# Patient Record
(demographics unavailable — no encounter records)

---

## 2024-11-22 NOTE — PHYSICAL EXAM
[General Appearance - Well Developed] : well developed [Normal Appearance] : normal appearance [Well Groomed] : well groomed [General Appearance - Well Nourished] : well nourished [No Deformities] : no deformities [General Appearance - In No Acute Distress] : no acute distress [Normal Conjunctiva] : the conjunctiva exhibited no abnormalities [Eyelids - No Xanthelasma] : the eyelids demonstrated no xanthelasmas [Normal Oral Mucosa] : normal oral mucosa [No Oral Pallor] : no oral pallor [No Oral Cyanosis] : no oral cyanosis [Normal Jugular Venous A Waves Present] : normal jugular venous A waves present [Normal Jugular Venous V Waves Present] : normal jugular venous V waves present [No Jugular Venous Antonio A Waves] : no jugular venous antonio A waves [Heart Rate And Rhythm] : heart rate and rhythm were normal [Heart Sounds] : normal S1 and S2 [Murmurs] : no murmurs present [Respiration, Rhythm And Depth] : normal respiratory rhythm and effort [Exaggerated Use Of Accessory Muscles For Inspiration] : no accessory muscle use [Auscultation Breath Sounds / Voice Sounds] : lungs were clear to auscultation bilaterally [Bowel Sounds] : normal bowel sounds [Abdomen Soft] : soft [Abdomen Tenderness] : non-tender [Abdomen Mass (___ Cm)] : no abdominal mass palpated [Abnormal Walk] : normal gait [Gait - Sufficient For Exercise Testing] : the gait was sufficient for exercise testing [Nail Clubbing] : no clubbing of the fingernails [Cyanosis, Localized] : no localized cyanosis [Petechial Hemorrhages (___cm)] : no petechial hemorrhages [Skin Color & Pigmentation] : normal skin color and pigmentation [] : no rash [No Venous Stasis] : no venous stasis [Skin Lesions] : no skin lesions [No Skin Ulcers] : no skin ulcer [No Xanthoma] : no  xanthoma was observed [Oriented To Time, Place, And Person] : oriented to person, place, and time

## 2024-12-10 NOTE — DISCUSSION/SUMMARY
[FreeTextEntry1] : PAIGE COMPLIANT AND BENEFITING ORDER SUPPLIES SARCOIDOSIS/ CARDIAC OFF TX PET SCAN REVIEWED CARDIO REVIEWED WEIGHT LOSS

## 2024-12-10 NOTE — REASON FOR VISIT
[Follow-Up] : a follow-up visit [Sleep Apnea] : sleep apnea [Sarcoidosis] : sarcoidosis [TextBox_44] : SARCOIDOSIS

## 2025-04-10 NOTE — CONSULT LETTER
[Dear  ___] : Dear  [unfilled], [Consult Letter:] : I had the pleasure of evaluating your patient, [unfilled]. [Please see my note below.] : Please see my note below. [Consult Closing:] : Thank you very much for allowing me to participate in the care of this patient.  If you have any questions, please do not hesitate to contact me. [Sincerely,] : Sincerely, [FreeTextEntry3] : Grey Dhillon DO Attending Physician, Hematology/ Medical Oncology [phone] office

## 2025-04-10 NOTE — CONSULT LETTER
[Dear  ___] : Dear  [unfilled], [Consult Letter:] : I had the pleasure of evaluating your patient, [unfilled]. [Please see my note below.] : Please see my note below. [Consult Closing:] : Thank you very much for allowing me to participate in the care of this patient.  If you have any questions, please do not hesitate to contact me. [Sincerely,] : Sincerely, [FreeTextEntry3] : Grey Dhillon DO Attending Physician, Hematology/ Medical Oncology [phone] office

## 2025-04-10 NOTE — CONSULT LETTER
[Dear  ___] : Dear  [unfilled], [Consult Letter:] : I had the pleasure of evaluating your patient, [unfilled]. [Please see my note below.] : Please see my note below. [Consult Closing:] : Thank you very much for allowing me to participate in the care of this patient.  If you have any questions, please do not hesitate to contact me. [Sincerely,] : Sincerely, [FreeTextEntry3] : Grey Dhillon DO Attending Physician, Hematology/ Medical Oncology [phone] office

## 2025-04-10 NOTE — REVIEW OF SYSTEMS
[Negative] : Allergic/Immunologic [Fever] : no fever [Chills] : no chills [Chest Pain] : no chest pain [FreeTextEntry2] : reports mild facial fullness since being on prednisone 20mg since 06/2019

## 2025-04-10 NOTE — ASSESSMENT
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 Balbuena; last MRI Cardiac on 12.6 suggests improvement in cardiac sarcoidosis - he remains on prednisone 20mg daily since 06/2019  - PCP prophylaxis (no dapsone / G6PD, sulfa allergy); completed atovaquone daily  - followup with PULM, Dr. Moisés Maravilla, as scheduled ; he also follows at [facility]   # Device associated (pacemaker) left upper extremity DVT occurring 3 1/2 months after device placed also complicated by subsegmental pulmonary emboli bilaterally - c/w eliquis until otherwise instructed; he was originally told for about 6 months duration per Dr. Balbuena but wishes to continue prescription since he is tolerating without complication and pacemaker device still implanted - CTa Chest from 02.2020 at [facility] shows hilar and mediastinal adenopathy; NO evidence of DVT - hypercoagulability workup from 02/2020 was negative, would be a weak reason to continue a/c lifelong; vascular team does not recommend lifelong a/c; pt wishes to continue - ultrasound in [facility] showed no clot in subclavian left vein but suspicion for dvt in IJV seen by vascular surgery at [facility] - he continues Eliquis 5mg every 12 hours  # Hemolytic anemia, believed to be provoked by the use of dapsone.  - His hgb /hct has been WNL even though haptoglobin and indirect bili is decreased.  - No clear explanation: pia neg,  g6pd neg, or HS or thalassemia or chronic liver disease, which are all negative; LDH is normal ; retic count is normal ; mild elevation of indirect bili raises suspicion of gilbert syndrome; however, haptoglobin is decreased - pia and G6PD negative;  since pia is negative, this suggests it is less likely autoimmune hemolysis secondary to sarcoidosis - Labwork today: CBC, BMP, LFT, GGT, LDH, direct pia test, haptoglobin, retic count - will continue to monitor hgb; pt instructed to avoid dapsone  # Elevated Bili and ALT - has been intermittently elevated in the past - ggi is mildly elevated: need to follow w/ GI/hepatology  RTC 1 month with CBC, BMP, LFT, LDH, direct pia test, haptoglobin, retic count prior  seen examined w/ NP MELBA Baer; note reviewed; case discussed; agree with the plan

## 2025-04-10 NOTE — HISTORY OF PRESENT ILLNESS
[de-identified] : Mr. 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 Balbuena.  Patient is a 58 y/o M with PMH of DM, complete heart block s/p PPM, Sarcoidosis recently started on Methotrexate and prednisone around June 2019.  Patient had been noticing mild swelling in left upper extremity for a few days but then had an increase in painful swelling around left axilla/left arm so he presented to the ER in late July 2019.  The patient states that he was diagnosed with sarcoidosis in October 2015. It was an incidental finding on a CT scan, that was given to the patient because of his strong family cardiovascular history.  He was not given any treatment or steroids at that time since he was asymptomatic.  The patient was previously asymptomatic but on April 17th 2019 he developed third degree heart block (possibly 2/2 the sarcoidosis) and was given a pacemaker and internal defibrillator as a result.  Due to his recent cardiac symptoms, thought to be related to his sarcoidosis, he was started on prednisone, methotrexate 125 mg and folic acid 1mg on June 10th 2019.   Patient was started on prednisone 40mg in June and being tapered by 5mg every 2 weeks.  He was also started on dapsone for PCP prophylaxis being on the prednisone (patient could not be given Bactrim 2/2 sulfa allergy) on July 7th 2019.  He stopped Dapsone 100mg on 7.30.19 and weekly methotrexate was stopped on 8.5.19 after admission to the hospital.  The patient noted that his eyes started to become increasingly yellow since he was admitted to the hospital.  The patient denies hemolysis in the past or a family history of any hematological or autoimmune conditions.  He is current taking 20mg of prednisone daily since 8.2.19 and was instructed to continue at this dose.  He completed a 21 day course of atovaquone   No history of venous thromboembolism in the past. No reported family history of bleeding or clotting disorders. \par  \par  LAB WORKUP\par  (8/9/19) WBC 10.33, Hgb 12.6, .4, RDW 15.9, , Bili 1.9, , Haptoglobin <20\par  (7/30/19) WBC 12.55, Hgb 14.3, MCV 99.3, RDW 17.7, , Retic count 3.9%\par  (7/10/18) WBC 12.10, Hgb 13.7, MCV 89.1, RDW 13.0, \par  \par  RADIOLOGIC WORKUP\par  US LE (7/30/19) Impression:No evidence of deep venous thrombosis or superficial thrombophlebitis in bilateral lower extremities.\par  CTa (7/29/19) IMPRESSION:  1. Bilateral subsegmental pulmonary emboli. No right heart strain.2. Stable mediastinal and bilateral hilar lymph nodes, compatible with sarcoidosis.3. Bilateral pulmonary nodules measuring up to 6 mm in the subpleural left lower lobe, with the majority stable since 2015. [de-identified] : 9/18/19 He is here for follow-up of hemolytic anemia.  He reports feeling well.  He has been tolerating Eliquis for about 3 months, treated by [facility].  He states they would like him to continue on it for 6 months duration.  He takes prednisone 20mg daily as maintenance.  Discussed that since pia is negative, this suggests it is less likely autoimmune hemolysis secondary to sarcoidosis.  1/3/20 He is here for follow-up of hemolytic anemia.  He reports feeling well.  He has been tolerating Eliquis for about 6 months now without complications.  He takes prednisone 20mg daily as maintenance for sarcoidosis managed by Dr. Balbuena.  He had MR Cardiac performed on 12.6 which suggested improvement in cardiac sarcoidosis.  He has also been evaluated by CARDIOLOGY for bouts of VT and has subsequently undergone ablation with improvement of symptoms.  Reviewed most recent CBC with patient, leucocytosis likely due to prednisone as he is feeling well.    2/28/2020 He is here for follow-up of hemolytic anemia.  Reviewed most recent scans which show adenoapathy, however patient already follows with Dr. Balbuena for PULM for sarcoidosis and restarted MTX 12.5mg weekly this past Monday.  They are anticipating tapering prednisone, but he remains on 20mg daily for now.  He also remains on Eliquis BiD and would like to continue for now since he is tolerating well.  We discussed that the event was likely provoked by the ICD wires and therefore lifelong a/c is not warranted. CXR (1.29.2020) IMPRESSION: Mediastinal and hilar lymphadenopathy seen to better advantage on yesterday's CT.  Left pacemaker/ICD.  No radiopaque foreign body identified.  CTa Chest (1.29.2020) IMPRESSION:  No definite evidence of lesion in the lower neck near left IJ vein.  Heterogenous contrast opacification at this level related to streak artifact and mixing.  Calcification in the anterior aspect of confluence of right IJ/subclavian vein probably related to prior thrombosis/recanalized thrombus.  2.  Bilateral hilar and mediastinal adenopathy, unchanged.  Some small solid nodules that are unchanged although others that are not comparable due to differences in technique from prior studies.  3.  New T4 sclerosis of uncertain etiology.  THis possibly may relate to sarcoidosis although correlation with any other significant history is suggested.  Follow-up chest CT  is suggested to reassess such as in 3-6 months, as indicated.  8/28/2020 Patient is here for a follow-up visit for hx of DVT and hemolytic anemia.  He is feeling well with no new complaints.  Most recent CBC is stable.  Patient denies fever, chills, nausea, vomiting, easier bruising or bleeding.  He remains on Eliquis 5mg BiD just over 1 year now and is adamant he wishes to continue, despite risks.  We reviewed research studies regarding risks v benefits of continuing Eliiquis.  He saw VASC SURG at [facility] who had stable findings on reimaging, and also stated length of need about 6-12 months but no indication to necessarily continue.    4/10/25 Patient is here for a follow-up visit for hx of DVT and hemolytic anemia.  He is feeling well with no new complaints.  Most recent CBC from outside lab is stable.  Patient denies fever, chills, nausea, vomiting, easier bruising or bleeding.  He remains on Eliquis 5mg BiD.   Labwork (3.10.2025) WBC 6, Hgb 15.3, , Total Bili 1.7, Indirect Bili 1.4, normal LFTs, Cr 0.89

## 2025-04-10 NOTE — HISTORY OF PRESENT ILLNESS
[de-identified] : Mr. 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 Balbuena.  Patient is a 56 y/o M with PMH of DM, complete heart block s/p PPM, Sarcoidosis recently started on Methotrexate and prednisone around June 2019.  Patient had been noticing mild swelling in left upper extremity for a few days but then had an increase in painful swelling around left axilla/left arm so he presented to the ER in late July 2019.  The patient states that he was diagnosed with sarcoidosis in October 2015. It was an incidental finding on a CT scan, that was given to the patient because of his strong family cardiovascular history.  He was not given any treatment or steroids at that time since he was asymptomatic.  The patient was previously asymptomatic but on April 17th 2019 he developed third degree heart block (possibly 2/2 the sarcoidosis) and was given a pacemaker and internal defibrillator as a result.  Due to his recent cardiac symptoms, thought to be related to his sarcoidosis, he was started on prednisone, methotrexate 125 mg and folic acid 1mg on June 10th 2019.   Patient was started on prednisone 40mg in June and being tapered by 5mg every 2 weeks.  He was also started on dapsone for PCP prophylaxis being on the prednisone (patient could not be given Bactrim 2/2 sulfa allergy) on July 7th 2019.  He stopped Dapsone 100mg on 7.30.19 and weekly methotrexate was stopped on 8.5.19 after admission to the hospital.  The patient noted that his eyes started to become increasingly yellow since he was admitted to the hospital.  The patient denies hemolysis in the past or a family history of any hematological or autoimmune conditions.  He is current taking 20mg of prednisone daily since 8.2.19 and was instructed to continue at this dose.  He completed a 21 day course of atovaquone   No history of venous thromboembolism in the past. No reported family history of bleeding or clotting disorders. \par  \par  LAB WORKUP\par  (8/9/19) WBC 10.33, Hgb 12.6, .4, RDW 15.9, , Bili 1.9, , Haptoglobin <20\par  (7/30/19) WBC 12.55, Hgb 14.3, MCV 99.3, RDW 17.7, , Retic count 3.9%\par  (7/10/18) WBC 12.10, Hgb 13.7, MCV 89.1, RDW 13.0, \par  \par  RADIOLOGIC WORKUP\par  US LE (7/30/19) Impression:No evidence of deep venous thrombosis or superficial thrombophlebitis in bilateral lower extremities.\par  CTa (7/29/19) IMPRESSION:  1. Bilateral subsegmental pulmonary emboli. No right heart strain.2. Stable mediastinal and bilateral hilar lymph nodes, compatible with sarcoidosis.3. Bilateral pulmonary nodules measuring up to 6 mm in the subpleural left lower lobe, with the majority stable since 2015. [de-identified] : 9/18/19 He is here for follow-up of hemolytic anemia.  He reports feeling well.  He has been tolerating Eliquis for about 3 months, treated by [facility].  He states they would like him to continue on it for 6 months duration.  He takes prednisone 20mg daily as maintenance.  Discussed that since pia is negative, this suggests it is less likely autoimmune hemolysis secondary to sarcoidosis.  1/3/20 He is here for follow-up of hemolytic anemia.  He reports feeling well.  He has been tolerating Eliquis for about 6 months now without complications.  He takes prednisone 20mg daily as maintenance for sarcoidosis managed by Dr. Balbuena.  He had MR Cardiac performed on 12.6 which suggested improvement in cardiac sarcoidosis.  He has also been evaluated by CARDIOLOGY for bouts of VT and has subsequently undergone ablation with improvement of symptoms.  Reviewed most recent CBC with patient, leucocytosis likely due to prednisone as he is feeling well.    2/28/2020 He is here for follow-up of hemolytic anemia.  Reviewed most recent scans which show adenoapathy, however patient already follows with Dr. Balbuena for PULM for sarcoidosis and restarted MTX 12.5mg weekly this past Monday.  They are anticipating tapering prednisone, but he remains on 20mg daily for now.  He also remains on Eliquis BiD and would like to continue for now since he is tolerating well.  We discussed that the event was likely provoked by the ICD wires and therefore lifelong a/c is not warranted. CXR (1.29.2020) IMPRESSION: Mediastinal and hilar lymphadenopathy seen to better advantage on yesterday's CT.  Left pacemaker/ICD.  No radiopaque foreign body identified.  CTa Chest (1.29.2020) IMPRESSION:  No definite evidence of lesion in the lower neck near left IJ vein.  Heterogenous contrast opacification at this level related to streak artifact and mixing.  Calcification in the anterior aspect of confluence of right IJ/subclavian vein probably related to prior thrombosis/recanalized thrombus.  2.  Bilateral hilar and mediastinal adenopathy, unchanged.  Some small solid nodules that are unchanged although others that are not comparable due to differences in technique from prior studies.  3.  New T4 sclerosis of uncertain etiology.  THis possibly may relate to sarcoidosis although correlation with any other significant history is suggested.  Follow-up chest CT  is suggested to reassess such as in 3-6 months, as indicated.  8/28/2020 Patient is here for a follow-up visit for hx of DVT and hemolytic anemia.  He is feeling well with no new complaints.  Most recent CBC is stable.  Patient denies fever, chills, nausea, vomiting, easier bruising or bleeding.  He remains on Eliquis 5mg BiD just over 1 year now and is adamant he wishes to continue, despite risks.  We reviewed research studies regarding risks v benefits of continuing Eliiquis.  He saw VASC SURG at [facility] who had stable findings on reimaging, and also stated length of need about 6-12 months but no indication to necessarily continue.    4/10/25 Patient is here for a follow-up visit for hx of DVT and hemolytic anemia.  He is feeling well with no new complaints.  Most recent CBC from outside lab is stable.  Patient denies fever, chills, nausea, vomiting, easier bruising or bleeding.  He remains on Eliquis 5mg BiD.   Labwork (3.10.2025) WBC 6, Hgb 15.3, , Total Bili 1.7, Indirect Bili 1.4, normal LFTs, Cr 0.89  04-Jan-2025 12:49

## 2025-04-10 NOTE — CONSULT LETTER
[Dear  ___] : Dear  [unfilled], [Consult Letter:] : I had the pleasure of evaluating your patient, [unfilled]. [Please see my note below.] : Please see my note below. [Consult Closing:] : Thank you very much for allowing me to participate in the care of this patient.  If you have any questions, please do not hesitate to contact me. [Sincerely,] : Sincerely, [FreeTextEntry3] : Grey Dhillon DO Attending Physician, Hematology/ Medical Oncology [phone] office

## 2025-04-10 NOTE — ASSESSMENT
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 Balbuena; last MRI Cardiac on 12.6 suggests improvement in cardiac sarcoidosis - he remains on prednisone 20mg daily since 06/2019  - PCP prophylaxis (no dapsone / G6PD, sulfa allergy); completed atovaquone daily  - followup with PULM, Dr. Moisés Maravilla, as scheduled ; he also follows at [facility]   # Device associated (pacemaker) left upper extremity DVT occurring 3 1/2 months after device placed also complicated by subsegmental pulmonary emboli bilaterally - c/w eliquis until otherwise instructed; he was originally told for about 6 months duration per Dr. Balbuena but wishes to continue prescription since he is tolerating without complication and pacemaker device still implanted - CTa Chest from 02.2020 at [facility] shows hilar and mediastinal adenopathy; NO evidence of DVT - hypercoagulability workup from 02/2020 was negative, would be a weak reason to continue a/c lifelong; vascular team does not recommend lifelong a/c; pt wishes to continue - ultrasound in [facility] showed no clot in subclavian left vein but suspicion for dvt in IJV seen by vascular surgery at [facility] - he continues Eliquis 5mg every 12 hours  # Hemolytic anemia, believed to be provoked by the use of dapsone.  - His hgb /hct has been WNL even though haptoglobin and indirect bili is decreased.  - No clear explanation: pia neg,  g6pd neg, or HS or thalassemia or chronic liver disease, which are all negative; LDH is normal ; retic count is normal ; mild elevation of indirect bili raises suspicion of gilbert syndrome; however, haptoglobin is decreased - pia and G6PD negative;  since pia is negative, this suggests it is less likely autoimmune hemolysis secondary to sarcoidosis - Labwork today: CBC, BMP, LFT, GGT, LDH, direct pia test, haptoglobin, retic count - will continue to monitor hgb; pt instructed to avoid dapsone  # Elevated Bili and ALT - has been intermittently elevated in the past - ggi is mildly elevated: need to follow w/ GI/hepatology  RTC 1 month with CBC, BMP, LFT, LDH, direct pia test, haptoglobin, retic count prior  seen examined w/ NP MELBA Baer; note reviewed; case discussed; agree with the plan

## 2025-04-10 NOTE — HISTORY OF PRESENT ILLNESS
[de-identified] : Mr. 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 Balbuena.  Patient is a 58 y/o M with PMH of DM, complete heart block s/p PPM, Sarcoidosis recently started on Methotrexate and prednisone around June 2019.  Patient had been noticing mild swelling in left upper extremity for a few days but then had an increase in painful swelling around left axilla/left arm so he presented to the ER in late July 2019.  The patient states that he was diagnosed with sarcoidosis in October 2015. It was an incidental finding on a CT scan, that was given to the patient because of his strong family cardiovascular history.  He was not given any treatment or steroids at that time since he was asymptomatic.  The patient was previously asymptomatic but on April 17th 2019 he developed third degree heart block (possibly 2/2 the sarcoidosis) and was given a pacemaker and internal defibrillator as a result.  Due to his recent cardiac symptoms, thought to be related to his sarcoidosis, he was started on prednisone, methotrexate 125 mg and folic acid 1mg on June 10th 2019.   Patient was started on prednisone 40mg in June and being tapered by 5mg every 2 weeks.  He was also started on dapsone for PCP prophylaxis being on the prednisone (patient could not be given Bactrim 2/2 sulfa allergy) on July 7th 2019.  He stopped Dapsone 100mg on 7.30.19 and weekly methotrexate was stopped on 8.5.19 after admission to the hospital.  The patient noted that his eyes started to become increasingly yellow since he was admitted to the hospital.  The patient denies hemolysis in the past or a family history of any hematological or autoimmune conditions.  He is current taking 20mg of prednisone daily since 8.2.19 and was instructed to continue at this dose.  He completed a 21 day course of atovaquone   No history of venous thromboembolism in the past. No reported family history of bleeding or clotting disorders. \par  \par  LAB WORKUP\par  (8/9/19) WBC 10.33, Hgb 12.6, .4, RDW 15.9, , Bili 1.9, , Haptoglobin <20\par  (7/30/19) WBC 12.55, Hgb 14.3, MCV 99.3, RDW 17.7, , Retic count 3.9%\par  (7/10/18) WBC 12.10, Hgb 13.7, MCV 89.1, RDW 13.0, \par  \par  RADIOLOGIC WORKUP\par  US LE (7/30/19) Impression:No evidence of deep venous thrombosis or superficial thrombophlebitis in bilateral lower extremities.\par  CTa (7/29/19) IMPRESSION:  1. Bilateral subsegmental pulmonary emboli. No right heart strain.2. Stable mediastinal and bilateral hilar lymph nodes, compatible with sarcoidosis.3. Bilateral pulmonary nodules measuring up to 6 mm in the subpleural left lower lobe, with the majority stable since 2015. [de-identified] : 9/18/19 He is here for follow-up of hemolytic anemia.  He reports feeling well.  He has been tolerating Eliquis for about 3 months, treated by [facility].  He states they would like him to continue on it for 6 months duration.  He takes prednisone 20mg daily as maintenance.  Discussed that since pia is negative, this suggests it is less likely autoimmune hemolysis secondary to sarcoidosis.  1/3/20 He is here for follow-up of hemolytic anemia.  He reports feeling well.  He has been tolerating Eliquis for about 6 months now without complications.  He takes prednisone 20mg daily as maintenance for sarcoidosis managed by Dr. Balbuena.  He had MR Cardiac performed on 12.6 which suggested improvement in cardiac sarcoidosis.  He has also been evaluated by CARDIOLOGY for bouts of VT and has subsequently undergone ablation with improvement of symptoms.  Reviewed most recent CBC with patient, leucocytosis likely due to prednisone as he is feeling well.    2/28/2020 He is here for follow-up of hemolytic anemia.  Reviewed most recent scans which show adenoapathy, however patient already follows with Dr. Balbuena for PULM for sarcoidosis and restarted MTX 12.5mg weekly this past Monday.  They are anticipating tapering prednisone, but he remains on 20mg daily for now.  He also remains on Eliquis BiD and would like to continue for now since he is tolerating well.  We discussed that the event was likely provoked by the ICD wires and therefore lifelong a/c is not warranted. CXR (1.29.2020) IMPRESSION: Mediastinal and hilar lymphadenopathy seen to better advantage on yesterday's CT.  Left pacemaker/ICD.  No radiopaque foreign body identified.  CTa Chest (1.29.2020) IMPRESSION:  No definite evidence of lesion in the lower neck near left IJ vein.  Heterogenous contrast opacification at this level related to streak artifact and mixing.  Calcification in the anterior aspect of confluence of right IJ/subclavian vein probably related to prior thrombosis/recanalized thrombus.  2.  Bilateral hilar and mediastinal adenopathy, unchanged.  Some small solid nodules that are unchanged although others that are not comparable due to differences in technique from prior studies.  3.  New T4 sclerosis of uncertain etiology.  THis possibly may relate to sarcoidosis although correlation with any other significant history is suggested.  Follow-up chest CT  is suggested to reassess such as in 3-6 months, as indicated.  8/28/2020 Patient is here for a follow-up visit for hx of DVT and hemolytic anemia.  He is feeling well with no new complaints.  Most recent CBC is stable.  Patient denies fever, chills, nausea, vomiting, easier bruising or bleeding.  He remains on Eliquis 5mg BiD just over 1 year now and is adamant he wishes to continue, despite risks.  We reviewed research studies regarding risks v benefits of continuing Eliiquis.  He saw VASC SURG at [facility] who had stable findings on reimaging, and also stated length of need about 6-12 months but no indication to necessarily continue.    4/10/25 Patient is here for a follow-up visit for hx of DVT and hemolytic anemia.  He is feeling well with no new complaints.  Most recent CBC from outside lab is stable.  Patient denies fever, chills, nausea, vomiting, easier bruising or bleeding.  He remains on Eliquis 5mg BiD.   Labwork (3.10.2025) WBC 6, Hgb 15.3, , Total Bili 1.7, Indirect Bili 1.4, normal LFTs, Cr 0.89

## 2025-04-10 NOTE — ASSESSMENT
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 Balbuena; last MRI Cardiac on 12.6 suggests improvement in cardiac sarcoidosis - he remains on prednisone 20mg daily since 06/2019  - PCP prophylaxis (no dapsone / G6PD, sulfa allergy); completed atovaquone daily  - followup with PULM, Dr. Moisés Maravilla, as scheduled ; he also follows at [facility]   # Device associated (pacemaker) left upper extremity DVT occurring 3 1/2 months after device placed also complicated by subsegmental pulmonary emboli bilaterally - c/w eliquis until otherwise instructed; he was originally told for about 6 months duration per Dr. Balbuena but wishes to continue prescription since he is tolerating without complication and pacemaker device still implanted - CTa Chest from 02.2020 at [facility] shows hilar and mediastinal adenopathy; NO evidence of DVT - hypercoagulability workup from 02/2020 was negative, would be a weak reason to continue a/c lifelong; vascular team does not recommend lifelong a/c; pt wishes to continue - ultrasound in [facility] showed no clot in subclavian left vein but suspicion for dvt in IJV seen by vascular surgery at [facility] - he continues Eliquis 5mg every 12 hours  # Hemolytic anemia, believed to be provoked by the use of dapsone.  - His hgb /hct has been WNL even though haptoglobin and indirect bili is decreased.  - No clear explanation: pia neg,  g6pd neg, or HS or thalassemia or chronic liver disease, which are all negative; LDH is normal ; retic count is normal ; mild elevation of indirect bili raises suspicion of gilbert syndrome; however, haptoglobin is decreased - pia and G6PD negative;  since pia is negative, this suggests it is less likely autoimmune hemolysis secondary to sarcoidosis - Labwork today: CBC, BMP, LFT, GGT, LDH, direct pia test, haptoglobin, retic count - will continue to monitor hgb; pt instructed to avoid dapsone  # Elevated Bili and ALT - has been intermittently elevated in the past - ggi is mildly elevated: need to follow w/ GI/hepatology  RTC 1 month with CBC, BMP, LFT, LDH, direct pia test, haptoglobin, retic count prior  seen examined w/ NP MELBA Baer; note reviewed; case discussed; agree with the plan

## 2025-04-10 NOTE — ASSESSMENT
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 Balbuena; last MRI Cardiac on 12.6 suggests improvement in cardiac sarcoidosis - he remains on prednisone 20mg daily since 06/2019  - PCP prophylaxis (no dapsone / G6PD, sulfa allergy); completed atovaquone daily  - followup with PULM, Dr. Moisés Maravilla, as scheduled ; he also follows at [facility]   # Device associated (pacemaker) left upper extremity DVT occurring 3 1/2 months after device placed also complicated by subsegmental pulmonary emboli bilaterally - c/w eliquis until otherwise instructed; he was originally told for about 6 months duration per Dr. Balbuena but wishes to continue prescription since he is tolerating without complication and pacemaker device still implanted - CTa Chest from 02.2020 at [facility] shows hilar and mediastinal adenopathy; NO evidence of DVT - hypercoagulability workup from 02/2020 was negative, would be a weak reason to continue a/c lifelong; vascular team does not recommend lifelong a/c; pt wishes to continue - ultrasound in [facility] showed no clot in subclavian left vein but suspicion for dvt in IJV seen by vascular surgery at [facility] - he continues Eliquis 5mg every 12 hours  # Hemolytic anemia, believed to be provoked by the use of dapsone.  - His hgb /hct has been WNL even though haptoglobin and indirect bili is decreased.  - No clear explanation: pia neg,  g6pd neg, or HS or thalassemia or chronic liver disease, which are all negative; LDH is normal ; retic count is normal ; mild elevation of indirect bili raises suspicion of gilbert syndrome; however, haptoglobin is decreased - pia and G6PD negative;  since pia is negative, this suggests it is less likely autoimmune hemolysis secondary to sarcoidosis - Labwork today: CBC, BMP, LFT, GGT, LDH, direct pia test, haptoglobin, retic count - will continue to monitor hgb; pt instructed to avoid dapsone  # Elevated Bili and ALT - has been intermittently elevated in the past - ggi is mildly elevated: need to follow w/ GI/hepatology  RTC 1 month with CBC, BMP, LFT, LDH, direct pia test, haptoglobin, retic count prior  seen examined w/ NP MELBA Baer; note reviewed; case discussed; agree with the plan

## 2025-04-10 NOTE — HISTORY OF PRESENT ILLNESS
[de-identified] : Mr. 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 Balbuena.  Patient is a 58 y/o M with PMH of DM, complete heart block s/p PPM, Sarcoidosis recently started on Methotrexate and prednisone around June 2019.  Patient had been noticing mild swelling in left upper extremity for a few days but then had an increase in painful swelling around left axilla/left arm so he presented to the ER in late July 2019.  The patient states that he was diagnosed with sarcoidosis in October 2015. It was an incidental finding on a CT scan, that was given to the patient because of his strong family cardiovascular history.  He was not given any treatment or steroids at that time since he was asymptomatic.  The patient was previously asymptomatic but on April 17th 2019 he developed third degree heart block (possibly 2/2 the sarcoidosis) and was given a pacemaker and internal defibrillator as a result.  Due to his recent cardiac symptoms, thought to be related to his sarcoidosis, he was started on prednisone, methotrexate 125 mg and folic acid 1mg on June 10th 2019.   Patient was started on prednisone 40mg in June and being tapered by 5mg every 2 weeks.  He was also started on dapsone for PCP prophylaxis being on the prednisone (patient could not be given Bactrim 2/2 sulfa allergy) on July 7th 2019.  He stopped Dapsone 100mg on 7.30.19 and weekly methotrexate was stopped on 8.5.19 after admission to the hospital.  The patient noted that his eyes started to become increasingly yellow since he was admitted to the hospital.  The patient denies hemolysis in the past or a family history of any hematological or autoimmune conditions.  He is current taking 20mg of prednisone daily since 8.2.19 and was instructed to continue at this dose.  He completed a 21 day course of atovaquone   No history of venous thromboembolism in the past. No reported family history of bleeding or clotting disorders. \par  \par  LAB WORKUP\par  (8/9/19) WBC 10.33, Hgb 12.6, .4, RDW 15.9, , Bili 1.9, , Haptoglobin <20\par  (7/30/19) WBC 12.55, Hgb 14.3, MCV 99.3, RDW 17.7, , Retic count 3.9%\par  (7/10/18) WBC 12.10, Hgb 13.7, MCV 89.1, RDW 13.0, \par  \par  RADIOLOGIC WORKUP\par  US LE (7/30/19) Impression:No evidence of deep venous thrombosis or superficial thrombophlebitis in bilateral lower extremities.\par  CTa (7/29/19) IMPRESSION:  1. Bilateral subsegmental pulmonary emboli. No right heart strain.2. Stable mediastinal and bilateral hilar lymph nodes, compatible with sarcoidosis.3. Bilateral pulmonary nodules measuring up to 6 mm in the subpleural left lower lobe, with the majority stable since 2015. [de-identified] : 9/18/19 He is here for follow-up of hemolytic anemia.  He reports feeling well.  He has been tolerating Eliquis for about 3 months, treated by [facility].  He states they would like him to continue on it for 6 months duration.  He takes prednisone 20mg daily as maintenance.  Discussed that since pia is negative, this suggests it is less likely autoimmune hemolysis secondary to sarcoidosis.  1/3/20 He is here for follow-up of hemolytic anemia.  He reports feeling well.  He has been tolerating Eliquis for about 6 months now without complications.  He takes prednisone 20mg daily as maintenance for sarcoidosis managed by Dr. Balbuena.  He had MR Cardiac performed on 12.6 which suggested improvement in cardiac sarcoidosis.  He has also been evaluated by CARDIOLOGY for bouts of VT and has subsequently undergone ablation with improvement of symptoms.  Reviewed most recent CBC with patient, leucocytosis likely due to prednisone as he is feeling well.    2/28/2020 He is here for follow-up of hemolytic anemia.  Reviewed most recent scans which show adenoapathy, however patient already follows with Dr. Balbuena for PULM for sarcoidosis and restarted MTX 12.5mg weekly this past Monday.  They are anticipating tapering prednisone, but he remains on 20mg daily for now.  He also remains on Eliquis BiD and would like to continue for now since he is tolerating well.  We discussed that the event was likely provoked by the ICD wires and therefore lifelong a/c is not warranted. CXR (1.29.2020) IMPRESSION: Mediastinal and hilar lymphadenopathy seen to better advantage on yesterday's CT.  Left pacemaker/ICD.  No radiopaque foreign body identified.  CTa Chest (1.29.2020) IMPRESSION:  No definite evidence of lesion in the lower neck near left IJ vein.  Heterogenous contrast opacification at this level related to streak artifact and mixing.  Calcification in the anterior aspect of confluence of right IJ/subclavian vein probably related to prior thrombosis/recanalized thrombus.  2.  Bilateral hilar and mediastinal adenopathy, unchanged.  Some small solid nodules that are unchanged although others that are not comparable due to differences in technique from prior studies.  3.  New T4 sclerosis of uncertain etiology.  THis possibly may relate to sarcoidosis although correlation with any other significant history is suggested.  Follow-up chest CT  is suggested to reassess such as in 3-6 months, as indicated.  8/28/2020 Patient is here for a follow-up visit for hx of DVT and hemolytic anemia.  He is feeling well with no new complaints.  Most recent CBC is stable.  Patient denies fever, chills, nausea, vomiting, easier bruising or bleeding.  He remains on Eliquis 5mg BiD just over 1 year now and is adamant he wishes to continue, despite risks.  We reviewed research studies regarding risks v benefits of continuing Eliiquis.  He saw VASC SURG at [facility] who had stable findings on reimaging, and also stated length of need about 6-12 months but no indication to necessarily continue.    4/10/25 Patient is here for a follow-up visit for hx of DVT and hemolytic anemia.  He is feeling well with no new complaints.  Most recent CBC from outside lab is stable.  Patient denies fever, chills, nausea, vomiting, easier bruising or bleeding.  He remains on Eliquis 5mg BiD.   Labwork (3.10.2025) WBC 6, Hgb 15.3, , Total Bili 1.7, Indirect Bili 1.4, normal LFTs, Cr 0.89

## 2025-04-11 NOTE — HISTORY OF PRESENT ILLNESS
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  This occurred approximately 3 months status post placement of pacemaker/ICD on the left side with associated wires in the left subclavian vein.  Patient also had pulmonary emboli at that time without right heart strain.  He was maintained on anticoagulation with subcu resolution of left arm swelling and all symptoms.  Patient has preferred to stay on the Eliquis since that time and remains on 5 mg twice daily.  Patient's past medical history also significant for cardiac sarcoidosis.  This is now considered to be dormant and he was recently weaned off of prednisone and methotrexate which she had been on for several years.  Patient with no complaints today.  Does not have any left arm swelling except for occasional trace edema which resolves almost immediately.  This trace edema is not related to any specific activity or positioning of the arm.  In 2020 there was some concern and when possible flow disturbance in the right internal jugular vein but CT angiogram was normal as was a duplex scan and it was considered to be likely flow artifact.  Patient here for routine follow-up.  No new complaints today.  He is also followed by pulmonology and hematology.   Per the patient he may require a third wire for the pacemaker to be placed to better treat some underlying arrhythmias.

## 2025-04-11 NOTE — ASSESSMENT
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  This occurred approximately 3 months status post placement of pacemaker/ICD on the left side with associated wires in the left subclavian vein.  Patient also had pulmonary emboli at that time without right heart strain.  He was maintained on anticoagulation with subcu resolution of left arm swelling and all symptoms.  Patient has preferred to stay on the Eliquis since that time and remains on 5 mg twice daily.  Patient's past medical history also significant for cardiac sarcoidosis.  This is now considered to be dormant and he was recently weaned off of prednisone and methotrexate which she had been on for several years.  Patient with no complaints today.  Does not have any left arm swelling except for occasional trace edema which resolves almost immediately.  This trace edema is not related to any specific activity or positioning of the arm.  In 2020 there was some concern and when possible flow disturbance in the right internal jugular vein but CT angiogram was normal as was a duplex scan and it was considered to be likely flow artifact.  Patient here for routine follow-up.  No new complaints today.  He is also followed by pulmonology and hematology.   Per the patient he may require a third wire for the pacemaker to be placed to better treat some underlying arrhythmias.  Duplex scan upper extremities subclavian veins and internal jugular veins bilaterally.  No evidence of deep vein thrombosis or flow disturbance bilaterally.  Pacemaker wires noted on the left side  Imp - pt doing well. Stabel - no evidence of recurrent DVT in upper extremities.  No IJ thrombosis. If needs additional pacing wires placed ok to DC Eliquis.   Pt now off prednisone and methotrexate.  [Arterial/Venous Disease] : arterial/venous disease

## 2025-04-11 NOTE — PHYSICAL EXAM
[JVD] : no jugular venous distention  [Normal Breath Sounds] : Normal breath sounds [Normal Rate and Rhythm] : normal rate and rhythm [2+] : left 2+ [Ankle Swelling (On Exam)] : not present [Varicose Veins Of Lower Extremities] : not present [No Rash or Lesion] : No rash or lesion [Alert] : alert [Oriented to Person] : oriented to person [Oriented to Place] : oriented to place [Oriented to Time] : oriented to time [Calm] : calm [de-identified] : Well-developed male in no distress [de-identified] : Normocephalic [de-identified] : No Or tenderness [de-identified] : Regular heart rhythm on examination today [de-identified] : Full range of motion

## 2025-04-11 NOTE — PROCEDURE
[FreeTextEntry1] : Duplex scan upper extremities subclavian veins and internal jugular veins bilaterally.  No evidence of deep vein thrombosis or flow disturbance bilaterally.  Pacemaker wires noted on the left side

## 2025-05-19 NOTE — ASSESSMENT
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 Balbuena; last MRI Cardiac on 12.6 suggests improvement in cardiac sarcoidosis - he remains on prednisone 20mg daily since 06/2019  - PCP prophylaxis (no dapsone / G6PD, sulfa allergy); completed atovaquone daily  - followup with PULM, Dr. Moisés Maravilla, as scheduled ; he also follows at [facility]   # Device associated (pacemaker) left upper extremity DVT occurring 3 1/2 months after device placed also complicated by subsegmental pulmonary emboli bilaterally - c/w eliquis until otherwise instructed; he was originally told for about 6 months duration per Dr. Balbuena but wishes to continue prescription since he is tolerating without complication and pacemaker device still implanted - CTa Chest from 02.2020 at [facility] shows hilar and mediastinal adenopathy; NO evidence of DVT - hypercoagulability workup from 02/2020 was negative, would be a weak reason to continue a/c lifelong; vascular team does not recommend lifelong a/c; pt wishes to continue - ultrasound in [facility] showed no clot in subclavian left vein but suspicion for dvt in IJV seen by vascular surgery at [facility] - he continues Eliquis 5mg every 12 hours  # Hemolytic anemia, believed to be provoked by the use of dapsone.  - His hgb /hct has been WNL even though haptoglobin and indirect bili is decreased.  - No clear explanation: pia neg,  g6pd neg, or HS or thalassemia or chronic liver disease, which are all negative; LDH is normal ; retic count is normal ; mild elevation of indirect bili raises suspicion of gilbert syndrome; however, haptoglobin is decreased - pia and G6PD negative;  since pia is negative, this suggests it is less likely autoimmune hemolysis secondary to sarcoidosis  - will continue to monitor hgb; pt instructed to avoid dapsone  # Elevated Bili and ALT - has been intermittently elevated in the past - ggi is mildly elevated: need to follow w/ GI/hepatology  RTC 1 year with CBC, BMP, LFT, LDH, direct pia test, haptoglobin, retic count prior

## 2025-05-19 NOTE — CONSULT LETTER
[Dear  ___] : Dear  [unfilled], [Consult Letter:] : I had the pleasure of evaluating your patient, [unfilled]. [Please see my note below.] : Please see my note below. [Consult Closing:] : Thank you very much for allowing me to participate in the care of this patient.  If you have any questions, please do not hesitate to contact me. [Sincerely,] : Sincerely, [FreeTextEntry3] : Grey Dhillon DO Attending Physician, Hematology/ Medical Oncology [phone] office

## 2025-05-19 NOTE — CONSULT LETTER
[Dear  ___] : Dear  [unfilled], [Consult Letter:] : I had the pleasure of evaluating your patient, [unfilled]. [Please see my note below.] : Please see my note below. [Consult Closing:] : Thank you very much for allowing me to participate in the care of this patient.  If you have any questions, please do not hesitate to contact me. [Sincerely,] : Sincerely, [FreeTextEntry3] : Grey Dhillon DO Attending Physician, Hematology/ Medical Oncology [phone] office

## 2025-05-19 NOTE — HISTORY OF PRESENT ILLNESS
[de-identified] : Mr. 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 Balbuena.  Patient is a 58 y/o M with PMH of DM, complete heart block s/p PPM, Sarcoidosis recently started on Methotrexate and prednisone around June 2019.  Patient had been noticing mild swelling in left upper extremity for a few days but then had an increase in painful swelling around left axilla/left arm so he presented to the ER in late July 2019.  The patient states that he was diagnosed with sarcoidosis in October 2015. It was an incidental finding on a CT scan, that was given to the patient because of his strong family cardiovascular history.  He was not given any treatment or steroids at that time since he was asymptomatic.  The patient was previously asymptomatic but on April 17th 2019 he developed third degree heart block (possibly 2/2 the sarcoidosis) and was given a pacemaker and internal defibrillator as a result.  Due to his recent cardiac symptoms, thought to be related to his sarcoidosis, he was started on prednisone, methotrexate 125 mg and folic acid 1mg on June 10th 2019.   Patient was started on prednisone 40mg in June and being tapered by 5mg every 2 weeks.  He was also started on dapsone for PCP prophylaxis being on the prednisone (patient could not be given Bactrim 2/2 sulfa allergy) on July 7th 2019.  He stopped Dapsone 100mg on 7.30.19 and weekly methotrexate was stopped on 8.5.19 after admission to the hospital.  The patient noted that his eyes started to become increasingly yellow since he was admitted to the hospital.  The patient denies hemolysis in the past or a family history of any hematological or autoimmune conditions.  He is current taking 20mg of prednisone daily since 8.2.19 and was instructed to continue at this dose.  He completed a 21 day course of atovaquone   No history of venous thromboembolism in the past. No reported family history of bleeding or clotting disorders. \par  \par  LAB WORKUP\par  (8/9/19) WBC 10.33, Hgb 12.6, .4, RDW 15.9, , Bili 1.9, , Haptoglobin <20\par  (7/30/19) WBC 12.55, Hgb 14.3, MCV 99.3, RDW 17.7, , Retic count 3.9%\par  (7/10/18) WBC 12.10, Hgb 13.7, MCV 89.1, RDW 13.0, \par  \par  RADIOLOGIC WORKUP\par  US LE (7/30/19) Impression:No evidence of deep venous thrombosis or superficial thrombophlebitis in bilateral lower extremities.\par  CTa (7/29/19) IMPRESSION:  1. Bilateral subsegmental pulmonary emboli. No right heart strain.2. Stable mediastinal and bilateral hilar lymph nodes, compatible with sarcoidosis.3. Bilateral pulmonary nodules measuring up to 6 mm in the subpleural left lower lobe, with the majority stable since 2015. [de-identified] : 9/18/19 He is here for follow-up of hemolytic anemia.  He reports feeling well.  He has been tolerating Eliquis for about 3 months, treated by [facility].  He states they would like him to continue on it for 6 months duration.  He takes prednisone 20mg daily as maintenance.  Discussed that since pia is negative, this suggests it is less likely autoimmune hemolysis secondary to sarcoidosis.  1/3/20 He is here for follow-up of hemolytic anemia.  He reports feeling well.  He has been tolerating Eliquis for about 6 months now without complications.  He takes prednisone 20mg daily as maintenance for sarcoidosis managed by Dr. Balbuena.  He had MR Cardiac performed on 12.6 which suggested improvement in cardiac sarcoidosis.  He has also been evaluated by CARDIOLOGY for bouts of VT and has subsequently undergone ablation with improvement of symptoms.  Reviewed most recent CBC with patient, leucocytosis likely due to prednisone as he is feeling well.    2/28/2020 He is here for follow-up of hemolytic anemia.  Reviewed most recent scans which show adenoapathy, however patient already follows with Dr. Balbuena for PULM for sarcoidosis and restarted MTX 12.5mg weekly this past Monday.  They are anticipating tapering prednisone, but he remains on 20mg daily for now.  He also remains on Eliquis BiD and would like to continue for now since he is tolerating well.  We discussed that the event was likely provoked by the ICD wires and therefore lifelong a/c is not warranted. CXR (1.29.2020) IMPRESSION: Mediastinal and hilar lymphadenopathy seen to better advantage on yesterday's CT.  Left pacemaker/ICD.  No radiopaque foreign body identified.  CTa Chest (1.29.2020) IMPRESSION:  No definite evidence of lesion in the lower neck near left IJ vein.  Heterogenous contrast opacification at this level related to streak artifact and mixing.  Calcification in the anterior aspect of confluence of right IJ/subclavian vein probably related to prior thrombosis/recanalized thrombus.  2.  Bilateral hilar and mediastinal adenopathy, unchanged.  Some small solid nodules that are unchanged although others that are not comparable due to differences in technique from prior studies.  3.  New T4 sclerosis of uncertain etiology.  THis possibly may relate to sarcoidosis although correlation with any other significant history is suggested.  Follow-up chest CT  is suggested to reassess such as in 3-6 months, as indicated.  8/28/2020 Patient is here for a follow-up visit for hx of DVT and hemolytic anemia.  He is feeling well with no new complaints.  Most recent CBC is stable.  Patient denies fever, chills, nausea, vomiting, easier bruising or bleeding.  He remains on Eliquis 5mg BiD just over 1 year now and is adamant he wishes to continue, despite risks.  We reviewed research studies regarding risks v benefits of continuing Eliiquis.  He saw VASC SURG at [facility] who had stable findings on reimaging, and also stated length of need about 6-12 months but no indication to necessarily continue.    4/10/25 Patient is here for a follow-up visit for hx of DVT and hemolytic anemia.  He is feeling well with no new complaints.  Most recent CBC from outside lab is stable.  Patient denies fever, chills, nausea, vomiting, easier bruising or bleeding.  He remains on Eliquis 5mg BiD.   Labwork (3.10.2025) WBC 6, Hgb 15.3, , Total Bili 1.7, Indirect Bili 1.4, normal LFTs, Cr 0.89   5/19/25 He recently followed with VASC SURG and had upper extremity duplex performed which was unrevealing.   US Duplex BI UE (4.11.2025) IMPRESSION:  All superficial and deep veins of the upper extremity are compressible and patent.  B-mode image and Doppler analysis of the subclavian, axillary, brachial, radial and ulnar are within normal limits consistent with the absence of DVT at this time.  No evidence of cephalic or basilic vein thrombosis at this time.

## 2025-05-19 NOTE — HISTORY OF PRESENT ILLNESS
[de-identified] : Mr. 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 Balbuena.  Patient is a 56 y/o M with PMH of DM, complete heart block s/p PPM, Sarcoidosis recently started on Methotrexate and prednisone around June 2019.  Patient had been noticing mild swelling in left upper extremity for a few days but then had an increase in painful swelling around left axilla/left arm so he presented to the ER in late July 2019.  The patient states that he was diagnosed with sarcoidosis in October 2015. It was an incidental finding on a CT scan, that was given to the patient because of his strong family cardiovascular history.  He was not given any treatment or steroids at that time since he was asymptomatic.  The patient was previously asymptomatic but on April 17th 2019 he developed third degree heart block (possibly 2/2 the sarcoidosis) and was given a pacemaker and internal defibrillator as a result.  Due to his recent cardiac symptoms, thought to be related to his sarcoidosis, he was started on prednisone, methotrexate 125 mg and folic acid 1mg on June 10th 2019.   Patient was started on prednisone 40mg in June and being tapered by 5mg every 2 weeks.  He was also started on dapsone for PCP prophylaxis being on the prednisone (patient could not be given Bactrim 2/2 sulfa allergy) on July 7th 2019.  He stopped Dapsone 100mg on 7.30.19 and weekly methotrexate was stopped on 8.5.19 after admission to the hospital.  The patient noted that his eyes started to become increasingly yellow since he was admitted to the hospital.  The patient denies hemolysis in the past or a family history of any hematological or autoimmune conditions.  He is current taking 20mg of prednisone daily since 8.2.19 and was instructed to continue at this dose.  He completed a 21 day course of atovaquone   No history of venous thromboembolism in the past. No reported family history of bleeding or clotting disorders. \par  \par  LAB WORKUP\par  (8/9/19) WBC 10.33, Hgb 12.6, .4, RDW 15.9, , Bili 1.9, , Haptoglobin <20\par  (7/30/19) WBC 12.55, Hgb 14.3, MCV 99.3, RDW 17.7, , Retic count 3.9%\par  (7/10/18) WBC 12.10, Hgb 13.7, MCV 89.1, RDW 13.0, \par  \par  RADIOLOGIC WORKUP\par  US LE (7/30/19) Impression:No evidence of deep venous thrombosis or superficial thrombophlebitis in bilateral lower extremities.\par  CTa (7/29/19) IMPRESSION:  1. Bilateral subsegmental pulmonary emboli. No right heart strain.2. Stable mediastinal and bilateral hilar lymph nodes, compatible with sarcoidosis.3. Bilateral pulmonary nodules measuring up to 6 mm in the subpleural left lower lobe, with the majority stable since 2015. [de-identified] : 9/18/19 He is here for follow-up of hemolytic anemia.  He reports feeling well.  He has been tolerating Eliquis for about 3 months, treated by [facility].  He states they would like him to continue on it for 6 months duration.  He takes prednisone 20mg daily as maintenance.  Discussed that since pia is negative, this suggests it is less likely autoimmune hemolysis secondary to sarcoidosis.  1/3/20 He is here for follow-up of hemolytic anemia.  He reports feeling well.  He has been tolerating Eliquis for about 6 months now without complications.  He takes prednisone 20mg daily as maintenance for sarcoidosis managed by Dr. Balbuena.  He had MR Cardiac performed on 12.6 which suggested improvement in cardiac sarcoidosis.  He has also been evaluated by CARDIOLOGY for bouts of VT and has subsequently undergone ablation with improvement of symptoms.  Reviewed most recent CBC with patient, leucocytosis likely due to prednisone as he is feeling well.    2/28/2020 He is here for follow-up of hemolytic anemia.  Reviewed most recent scans which show adenoapathy, however patient already follows with Dr. Balbuena for PULM for sarcoidosis and restarted MTX 12.5mg weekly this past Monday.  They are anticipating tapering prednisone, but he remains on 20mg daily for now.  He also remains on Eliquis BiD and would like to continue for now since he is tolerating well.  We discussed that the event was likely provoked by the ICD wires and therefore lifelong a/c is not warranted. CXR (1.29.2020) IMPRESSION: Mediastinal and hilar lymphadenopathy seen to better advantage on yesterday's CT.  Left pacemaker/ICD.  No radiopaque foreign body identified.  CTa Chest (1.29.2020) IMPRESSION:  No definite evidence of lesion in the lower neck near left IJ vein.  Heterogenous contrast opacification at this level related to streak artifact and mixing.  Calcification in the anterior aspect of confluence of right IJ/subclavian vein probably related to prior thrombosis/recanalized thrombus.  2.  Bilateral hilar and mediastinal adenopathy, unchanged.  Some small solid nodules that are unchanged although others that are not comparable due to differences in technique from prior studies.  3.  New T4 sclerosis of uncertain etiology.  THis possibly may relate to sarcoidosis although correlation with any other significant history is suggested.  Follow-up chest CT  is suggested to reassess such as in 3-6 months, as indicated.  8/28/2020 Patient is here for a follow-up visit for hx of DVT and hemolytic anemia.  He is feeling well with no new complaints.  Most recent CBC is stable.  Patient denies fever, chills, nausea, vomiting, easier bruising or bleeding.  He remains on Eliquis 5mg BiD just over 1 year now and is adamant he wishes to continue, despite risks.  We reviewed research studies regarding risks v benefits of continuing Eliiquis.  He saw VASC SURG at [facility] who had stable findings on reimaging, and also stated length of need about 6-12 months but no indication to necessarily continue.    4/10/25 Patient is here for a follow-up visit for hx of DVT and hemolytic anemia.  He is feeling well with no new complaints.  Most recent CBC from outside lab is stable.  Patient denies fever, chills, nausea, vomiting, easier bruising or bleeding.  He remains on Eliquis 5mg BiD.   Labwork (3.10.2025) WBC 6, Hgb 15.3, , Total Bili 1.7, Indirect Bili 1.4, normal LFTs, Cr 0.89   5/19/25 He recently followed with VASC SURG and had upper extremity duplex performed which was unrevealing.   US Duplex BI UE (4.11.2025) IMPRESSION:  All superficial and deep veins of the upper extremity are compressible and patent.  B-mode image and Doppler analysis of the subclavian, axillary, brachial, radial and ulnar are within normal limits consistent with the absence of DVT at this time.  No evidence of cephalic or basilic vein thrombosis at this time.

## 2025-05-19 NOTE — ASSESSMENT
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 Balbuena; last MRI Cardiac on 12.6 suggests improvement in cardiac sarcoidosis - he remains on prednisone 20mg daily since 06/2019  - PCP prophylaxis (no dapsone / G6PD, sulfa allergy); completed atovaquone daily  - followup with PULM, Dr. Moisés Maravilla, as scheduled ; he also follows at [facility]   # Device associated (pacemaker) left upper extremity DVT occurring 3 1/2 months after device placed also complicated by subsegmental pulmonary emboli bilaterally - c/w eliquis until otherwise instructed; he was originally told for about 6 months duration per Dr. Balbuena but wishes to continue prescription since he is tolerating without complication and pacemaker device still implanted - CTa Chest from 02.2020 at [facility] shows hilar and mediastinal adenopathy; NO evidence of DVT - hypercoagulability workup from 02/2020 was negative, would be a weak reason to continue a/c lifelong; vascular team does not recommend lifelong a/c; pt wishes to continue - ultrasound in [facility] showed no clot in subclavian left vein but suspicion for dvt in IJV seen by vascular surgery at [facility] - he continues Eliquis 5mg every 12 hours  # Hemolytic anemia, believed to be provoked by the use of dapsone.  - His hgb /hct has been WNL even though haptoglobin and indirect bili is decreased.  - No clear explanation: pia neg,  g6pd neg, or HS or thalassemia or chronic liver disease, which are all negative; LDH is normal ; retic count is normal ; mild elevation of indirect bili raises suspicion of gilbert syndrome; however, haptoglobin is decreased - pia and G6PD negative;  since pia is negative, this suggests it is less likely autoimmune hemolysis secondary to sarcoidosis  - will continue to monitor hgb; pt instructed to avoid dapsone  # Elevated Bili and ALT - has been intermittently elevated in the past - ggi is mildly elevated: need to follow w/ GI/hepatology  RTC 1 year with CBC, BMP, LFT, LDH, direct pia test, haptoglobin, retic count prior

## 2025-06-12 NOTE — ASSESSMENT
[FreeTextEntry1] : Mr. MEZA will proceed with an MRI lumbar spine for further evaluation.  Since he has a pacemaker/defibrillator he will have this done SI / .   He will continue with outpatient PT.  I will see him back once the MRI is completed.  Patient is agreeable with our plan of care.